# Patient Record
Sex: FEMALE | Race: WHITE
[De-identification: names, ages, dates, MRNs, and addresses within clinical notes are randomized per-mention and may not be internally consistent; named-entity substitution may affect disease eponyms.]

---

## 2017-05-22 ENCOUNTER — HOSPITAL ENCOUNTER (OUTPATIENT)
Dept: HOSPITAL 47 - RADUSMAIN | Age: 22
Discharge: HOME | End: 2017-05-22
Payer: COMMERCIAL

## 2017-05-22 DIAGNOSIS — O20.9: Primary | ICD-10-CM

## 2017-05-22 DIAGNOSIS — Z3A.10: ICD-10-CM

## 2017-05-22 PROCEDURE — 76801 OB US < 14 WKS SINGLE FETUS: CPT

## 2017-05-22 NOTE — US
EXAMINATION TYPE: US OB <= 14 wk fetus

 

DATE OF EXAM: 5/22/2017 7:11 PM

 

COMPARISON: NONE

 

CLINICAL HISTORY: M93.9 Abnormal vaginal bleeding. Spotting x1 day per patient. Cramping

 

EXAM PERFORMED:  Transabdominal (TA)

 

EXAM MEASUREMENTS:

 

GESTATIONAL AGE / DATING

 

Physician Established: (10 weeks/1 days)

** EDC:  12/17/2017

Dates by LMP:  Unsure

Dates by First Scan:  No previous at this facility

Dates by Current Scan for:  (10 weeks/3 days)

** EDC: 12/15/2017

 

MATERNAL ANATOMY 

 

Uterus: 11.5 x 7.0 x 8.6 cm

Right Ovary: 3.3 x 1.7 x 1.6 cm

Left Ovary: 3.4 x 2.1 x 2.4 cm

Post CDS / Adnexa: wnl

Presence of free fluid: No

Presence of corpus luteal cyst: No

Presence of subchorionic bleed: No

 

GESTATION / FETAL SURVEY

 

CRL: 3.5 cm (10 weeks/3 days)

Yolk Sac (normal less than 6mm): 3 mm

Heart Rate: 168 bpm

Rhythm:  Normal

IUP:  Viable IUP

 

 

Date of LMP: Unknown 

Beta HcG (if available):  Not available 

 

Viable IUP, measurements congruent with dates 

 

 

 

IMPRESSION:

Ultrasound gestational age is 10 weeks 3 days. I see no complicating process.

## 2017-07-05 ENCOUNTER — HOSPITAL ENCOUNTER (OUTPATIENT)
Dept: HOSPITAL 47 - LABWHC1 | Age: 22
Discharge: HOME | End: 2017-07-05
Payer: COMMERCIAL

## 2017-07-05 DIAGNOSIS — Z34.02: Primary | ICD-10-CM

## 2017-07-05 DIAGNOSIS — Z3A.00: ICD-10-CM

## 2017-07-05 LAB
CH: 31.1
CHCM: 34.4
ERYTHROCYTE [DISTWIDTH] IN BLOOD BY AUTOMATED COUNT: 3.94 M/UL (ref 3.8–5.4)
ERYTHROCYTE [DISTWIDTH] IN BLOOD: 13.9 % (ref 11.5–15.5)
GLUCOSE SERPL-MCNC: 73 MG/DL (ref 74–99)
HCT VFR BLD AUTO: 35.8 % (ref 34–46)
HDW: 2.22
HEPATITIS B SURFACE AG INDEX: 0.04
HGB BLD-MCNC: 12.7 GM/DL (ref 11.4–16)
MCH RBC QN AUTO: 32.3 PG (ref 25–35)
MCHC RBC AUTO-ENTMCNC: 35.6 G/DL (ref 31–37)
MCV RBC AUTO: 90.8 FL (ref 80–100)
NON-AFRICAN AMERICAN GFR(MDRD): >60
WBC # BLD AUTO: 9.5 K/UL (ref 3.8–10.6)

## 2017-07-05 PROCEDURE — 87340 HEPATITIS B SURFACE AG IA: CPT

## 2017-07-05 PROCEDURE — 82947 ASSAY GLUCOSE BLOOD QUANT: CPT

## 2017-07-05 PROCEDURE — 86900 BLOOD TYPING SEROLOGIC ABO: CPT

## 2017-07-05 PROCEDURE — 86778 TOXOPLASMA ANTIBODY IGM: CPT

## 2017-07-05 PROCEDURE — 84702 CHORIONIC GONADOTROPIN TEST: CPT

## 2017-07-05 PROCEDURE — 36415 COLL VENOUS BLD VENIPUNCTURE: CPT

## 2017-07-05 PROCEDURE — 82105 ALPHA-FETOPROTEIN SERUM: CPT

## 2017-07-05 PROCEDURE — 82565 ASSAY OF CREATININE: CPT

## 2017-07-05 PROCEDURE — 86762 RUBELLA ANTIBODY: CPT

## 2017-07-05 PROCEDURE — 86780 TREPONEMA PALLIDUM: CPT

## 2017-07-05 PROCEDURE — 85027 COMPLETE CBC AUTOMATED: CPT

## 2017-07-05 PROCEDURE — 86777 TOXOPLASMA ANTIBODY: CPT

## 2017-07-05 PROCEDURE — 86850 RBC ANTIBODY SCREEN: CPT

## 2017-07-05 PROCEDURE — 86901 BLOOD TYPING SEROLOGIC RH(D): CPT

## 2017-07-05 PROCEDURE — 86336 INHIBIN A: CPT

## 2017-07-05 PROCEDURE — 82677 ASSAY OF ESTRIOL: CPT

## 2017-07-06 LAB
ALPHA FETOPROTEIN (M.O.M): 0.9
FET NEURAL TUBE DEF RISK FROM MAT AGE QN: 23
GA (DAYS): 5 D
HCG MOM SERPL: 1.05
HCG MOM SERPL: 25.9 IU/ML
INHIBIN A MOM SERPL: 1.59
INHIBIN A SERPL-MCNC: 1.09
T GONDII IGG SER-ACNC: <3 IU/ML (ref ?–7.2)

## 2017-08-25 ENCOUNTER — HOSPITAL ENCOUNTER (OUTPATIENT)
Dept: HOSPITAL 47 - LABWHC1 | Age: 22
Discharge: HOME | End: 2017-08-25
Payer: COMMERCIAL

## 2017-08-25 DIAGNOSIS — Z34.02: Primary | ICD-10-CM

## 2017-08-25 LAB
CH: 32.2
CHCM: 33.9
ERYTHROCYTE [DISTWIDTH] IN BLOOD BY AUTOMATED COUNT: 3.87 M/UL (ref 3.8–5.4)
ERYTHROCYTE [DISTWIDTH] IN BLOOD: 13 % (ref 11.5–15.5)
HCT VFR BLD AUTO: 37 % (ref 34–46)
HDW: 2.19
HGB BLD-MCNC: 12.6 GM/DL (ref 11.4–16)
MCH RBC QN AUTO: 32.5 PG (ref 25–35)
MCHC RBC AUTO-ENTMCNC: 34 G/DL (ref 31–37)
MCV RBC AUTO: 95.5 FL (ref 80–100)
WBC # BLD AUTO: 10 K/UL (ref 3.8–10.6)

## 2017-08-25 PROCEDURE — 36415 COLL VENOUS BLD VENIPUNCTURE: CPT

## 2017-08-25 PROCEDURE — 85027 COMPLETE CBC AUTOMATED: CPT

## 2017-08-25 PROCEDURE — 82950 GLUCOSE TEST: CPT

## 2017-09-19 ENCOUNTER — HOSPITAL ENCOUNTER (OUTPATIENT)
Dept: HOSPITAL 47 - LABWHC1 | Age: 22
Discharge: HOME | End: 2017-09-19
Payer: COMMERCIAL

## 2017-09-19 DIAGNOSIS — O24.419: Primary | ICD-10-CM

## 2017-09-19 DIAGNOSIS — Z3A.00: ICD-10-CM

## 2017-09-19 LAB
GLUCOSE 3H P GLC SERPL-MCNC: 79 MG/DL
GTT SERPL-IMP: (no result)

## 2017-09-19 PROCEDURE — 36415 COLL VENOUS BLD VENIPUNCTURE: CPT

## 2017-09-19 PROCEDURE — 82952 GTT-ADDED SAMPLES: CPT

## 2017-09-19 PROCEDURE — 82951 GLUCOSE TOLERANCE TEST (GTT): CPT

## 2020-08-03 ENCOUNTER — HOSPITAL ENCOUNTER (OUTPATIENT)
Dept: HOSPITAL 47 - FBPOP | Age: 25
Discharge: HOME | End: 2020-08-03
Attending: OBSTETRICS & GYNECOLOGY
Payer: COMMERCIAL

## 2020-08-03 VITALS
DIASTOLIC BLOOD PRESSURE: 74 MMHG | RESPIRATION RATE: 16 BRPM | HEART RATE: 74 BPM | SYSTOLIC BLOOD PRESSURE: 118 MMHG | TEMPERATURE: 98.1 F

## 2020-08-03 DIAGNOSIS — Z3A.33: ICD-10-CM

## 2020-08-03 DIAGNOSIS — O21.9: Primary | ICD-10-CM

## 2020-08-03 LAB
KETONES UR QL STRIP.AUTO: (no result)
PH UR: 5.5 [PH] (ref 5–8)
PROT UR QL: (no result)
RBC UR QL: 2 /HPF (ref 0–5)
SP GR UR: 1.02 (ref 1–1.03)
SQUAMOUS UR QL AUTO: 3 /HPF (ref 0–4)
UROBILINOGEN UR QL STRIP: <2 MG/DL (ref ?–2)
WBC #/AREA URNS HPF: 7 /HPF (ref 0–5)

## 2020-08-03 PROCEDURE — 80306 DRUG TEST PRSMV INSTRMNT: CPT

## 2020-08-03 PROCEDURE — 99214 OFFICE O/P EST MOD 30 MIN: CPT

## 2020-08-03 PROCEDURE — 96360 HYDRATION IV INFUSION INIT: CPT

## 2020-08-03 PROCEDURE — 81001 URINALYSIS AUTO W/SCOPE: CPT

## 2020-08-03 PROCEDURE — 59025 FETAL NON-STRESS TEST: CPT

## 2020-08-06 NOTE — P.MSEPDOC
Presenting Problems





- Arrival Data


Date of Arrival on Unit: 20


Time of Arrival on Unit: 09:55


Mode of Transport: Wheelchair





- Complaint


OB-Reason for Admission/Chief Complaint: Other


Comment: pt arrived c/o throwing up blood since this am





Prenatal Medical History





- Pregnancy Information


: 2


Para: 1


Term: 1


: 0


Abortions: Spontaneous or Elective: 0


Number of Living Children: 1





- Gestational Age


Gestational Age by DONAVON (wks/days): 33 Weeks and 5 Days





- Prenatal History


Comment: pt has had limited prenatal visits





Review of Systems





- Review of Systems


Constitutional: No problems


Breast: No problems


ENT: No problems


Cardiovascular: No problems


Respiratory: No problems


Gastrointestinal: No problems


Genitourinary: No problems


Musculoskeletal: No problems


Neurological: No problems


Skin: No problems





Vital Signs





- Temperature


Temperature: 98.1 F


Temperature Source: Oral





- Pulse


  ** Right Brachial


Pulse Rate: 74


Pulse Assessment Method: Automatic Cuff





- Respirations


Respiratory Rate: 16


Oxygen Delivery Method: Room Air





- Blood Pressure


  ** Right Arm


Blood Pressure: 118/74


Blood Pressure Mean: 88


Blood Pressure Source: Automatic Cuff





Medical Screen Scoring (Pre)





- Cervical Exam


Dilation: 0 cm = 0


Membranes: Intact





- Uterine Contractions


Frequency: N/A


Duration: N/A


Intensity: N/A





- Maternal Vital Signs


Maternal Temperature: N/A


Maternal Blood Pressure: N/A


Signs of Preeclampsia: Nausea/Vomiting = 1


Maternal Respirations: N/A





- Maternal Trauma


Maternal Trauma: N/A





- Fetal Assessment - Baby A


Baseline FHR: 130


Fetal Heart Rate - NICHD Category: Category I (Normal) = 0


NST: Reactive


Fetal Position: N/A


Fetal Station: N/A





- Total Score - Baby A


Total Score - Baby A: 1





- Total Score - Baby B


Total Score - Baby B: 1





- Total Score - Baby C


Total Score - Baby C: 1





- Level of Risk - Baby A


Level of Risk - Baby A: Low (0-5)





- Level of Risk - Baby B


Level of Risk - Baby B: Low (0-5)





- Level of Risk - Baby C


Level of Risk - Baby C: Low (0-5)





Physician Notification (Pre)





- Physician Notified


Physician Notified Date: 20


Physician Notified Time: 10:50


New Order Received: Yes





- Notification Comment


Comment: pt had 1 bile emesis  with no blood noted.  clean catch u/s with  a 

drug urine sent to lab.  iv hydrated with 1000 cc of fluid.  may discharge to 

home after iv hydration





Disposition





- Disposition


OB Disposition: Discharge to home


Discharge Date: 20


Discharge Time: 14:16


I agree with the RN Medical Screening Exam: Yes


Risk & Benefit of care provided described in d/c instruction: Yes


Diagnosis: VOMITING OF PREGNANCY, UNSPECIFIED

## 2020-09-17 NOTE — P.HPOB
History of Present Illness


H&P Date: 20


Chief Complaint: Scheduled repeat  section with tubal





This is a 25 y.o. female,  2, para 1, with an estimated date of 

confinement of 2020, estimated gestational age of 39-4/7 weeks, who 

presents for scheduled repeat  section with bilateral partial 

salpingectomy for family planning.  She admits to good fetal movement and denies

regular contractions.  She did have a gap in prenatal care from 21 to 34 weeks. 

Her drug screens have been positive for marijuana despite being told that she 

cannot use it in pregnancy.





Prenatal labs:





Hepatitis B surface antigen-neg


RPR-NR


Rubella-immune


Blood type-O+


Antibody screen-neg


Hemoglobin-12


Quad-neg


Random glucose-67


GBS-neg


1 hr. GTT-111





OB Hx:  .  Elective  due to HSV


Gyn Hx:  Hx HSV.


Social Hx:  Single.  Unemployed.





Review of Systems


Constitutional: Denies chills, Denies fever


Eyes: denies blurred vision, denies pain


Ears, nose, mouth and throat: Denies headache, Denies sore throat


Cardiovascular: Denies chest pain, Denies shortness of breath


Respiratory: Denies cough


Gastrointestinal: Reports abdominal pain (irreg. ctxs.)


Genitourinary: Reports pelvic pain, Reports pregnant


Musculoskeletal: Reports low back pain


Integumentary: Denies pruritus, Denies rash


Neurological: Denies numbness, Denies weakness


Psychiatric: Reports depression





Past Medical History


Past Medical History: No Reported History


History of Any Multi-Drug Resistant Organisms: None Reported


Past Surgical History:  Section


Past Anesthesia/Blood Transfusion Reactions: No Reported Reaction


Past Psychological History: Depression


Smoking Status: Former smoker


Past Alcohol Use History: None Reported


Past Drug Use History: Marijuana





- Past Family History


  ** Mother


Family Medical History: No Reported History





Medications and Allergies


                                Home Medications











 Medication  Instructions  Recorded  Confirmed  Type


 


Pnv No.95/Ferrous Fum/Folic AC 1 each PO DAILY 17 History





[Prenatal Multivitamin Tablet]    








                                    Allergies











Allergy/AdvReac Type Severity Reaction Status Date / Time


 


No Known Allergies Allergy   Verified 20 06:50














Exam


Osteopathic Statement: *.  No significant issues noted on an osteopathic 

structural exam other than those noted in the History and Physical/Consult.





HEENT:  within normal limits


Heart:  regular rate and rhythm


Lungs:  clear to auscultation bilaterally


Abdomen:  , non-tender


Cervix:  cl./60%/-1


Fetal heart tones:  110-115s by doppler


Extremities:  neg. Shruti's





Results


Result Diagrams: 


                                 20 07:29








Assessment and Plan


(1) Family planning


Current Visit: No   Status: Acute   Code(s): Z30.09 - ENCOUNTER FOR [facility] GENERAL 

CNSL AND ADVICE ON CONTRACEPTION   SNOMED Code(s): 956319885


   





(2) 39 weeks gestation of pregnancy


Current Visit: No   Status: Acute   Code(s): Z3A.39 - 39 WEEKS GESTATION OF 

PREGNANCY   SNOMED Code(s): 54366253


   


Plan: 





Proceed with repeat  section with bilateral partial salpingectomy.





I have discussed the risks, benefits, and alternative therapies for the above-

mentioned procedure and for both sedation/anesthesia as well as necessary blood 

products administration, if indicated, as they pertain to this patient.  The 

patient has indicated her understanding and acceptance of the risks and 

procedures discussed.

## 2020-09-18 ENCOUNTER — HOSPITAL ENCOUNTER (INPATIENT)
Dept: HOSPITAL 47 - 4FBP | Age: 25
LOS: 2 days | Discharge: HOME | End: 2020-09-20
Attending: OBSTETRICS & GYNECOLOGY | Admitting: OBSTETRICS & GYNECOLOGY
Payer: COMMERCIAL

## 2020-09-18 VITALS — BODY MASS INDEX: 27.1 KG/M2

## 2020-09-18 DIAGNOSIS — Z3A.39: ICD-10-CM

## 2020-09-18 DIAGNOSIS — Z79.899: ICD-10-CM

## 2020-09-18 DIAGNOSIS — O34.211: Primary | ICD-10-CM

## 2020-09-18 DIAGNOSIS — F32.9: ICD-10-CM

## 2020-09-18 DIAGNOSIS — Z87.891: ICD-10-CM

## 2020-09-18 DIAGNOSIS — Z30.2: ICD-10-CM

## 2020-09-18 LAB
BASOPHILS # BLD AUTO: 0 K/UL (ref 0–0.2)
BASOPHILS NFR BLD AUTO: 0 %
EOSINOPHIL # BLD AUTO: 0.2 K/UL (ref 0–0.7)
EOSINOPHIL NFR BLD AUTO: 3 %
ERYTHROCYTE [DISTWIDTH] IN BLOOD BY AUTOMATED COUNT: 3.86 M/UL (ref 3.8–5.4)
ERYTHROCYTE [DISTWIDTH] IN BLOOD: 12.7 % (ref 11.5–15.5)
HCT VFR BLD AUTO: 34.8 % (ref 34–46)
HGB BLD-MCNC: 11.3 GM/DL (ref 11.4–16)
LYMPHOCYTES # SPEC AUTO: 1.5 K/UL (ref 1–4.8)
LYMPHOCYTES NFR SPEC AUTO: 22 %
MCH RBC QN AUTO: 29.2 PG (ref 25–35)
MCHC RBC AUTO-ENTMCNC: 32.4 G/DL (ref 31–37)
MCV RBC AUTO: 90.1 FL (ref 80–100)
MONOCYTES # BLD AUTO: 0.3 K/UL (ref 0–1)
MONOCYTES NFR BLD AUTO: 5 %
NEUTROPHILS # BLD AUTO: 4.6 K/UL (ref 1.3–7.7)
NEUTROPHILS NFR BLD AUTO: 69 %
PLATELET # BLD AUTO: 238 K/UL (ref 150–450)
WBC # BLD AUTO: 6.7 K/UL (ref 3.8–10.6)

## 2020-09-18 PROCEDURE — 86901 BLOOD TYPING SEROLOGIC RH(D): CPT

## 2020-09-18 PROCEDURE — 80306 DRUG TEST PRSMV INSTRMNT: CPT

## 2020-09-18 PROCEDURE — 86900 BLOOD TYPING SEROLOGIC ABO: CPT

## 2020-09-18 PROCEDURE — 0UB70ZZ EXCISION OF BILATERAL FALLOPIAN TUBES, OPEN APPROACH: ICD-10-PCS

## 2020-09-18 PROCEDURE — 88302 TISSUE EXAM BY PATHOLOGIST: CPT

## 2020-09-18 PROCEDURE — 86850 RBC ANTIBODY SCREEN: CPT

## 2020-09-18 PROCEDURE — 85025 COMPLETE CBC W/AUTO DIFF WBC: CPT

## 2020-09-18 RX ADMIN — POTASSIUM CHLORIDE SCH: 14.9 INJECTION, SOLUTION INTRAVENOUS at 09:00

## 2020-09-18 RX ADMIN — PRENATAL W/O A VIT W/ FE FUMARATE-FA CAP 106.5-1 MG SCH: 106.5 CAPSULE ORAL at 09:00

## 2020-09-18 RX ADMIN — DOCUSATE SODIUM AND SENNOSIDES SCH: 50; 8.6 TABLET ORAL at 21:03

## 2020-09-18 RX ADMIN — KETOROLAC TROMETHAMINE PRN MG: 15 INJECTION, SOLUTION INTRAMUSCULAR; INTRAVENOUS at 16:44

## 2020-09-18 RX ADMIN — POTASSIUM CHLORIDE SCH MLS/HR: 14.9 INJECTION, SOLUTION INTRAVENOUS at 12:11

## 2020-09-18 NOTE — P.OP
Date of Procedure: 20


Preoperative Diagnosis: 


1.  Intrauterine pregnancy at 39-4/7 weeks.


2.  History of previous  section.


3.  Family-planning.








Postoperative Diagnosis: 


Same


Procedure(s) Performed: 


Repeat low transverse  section with bilateral partial salpingectomy


Anesthesia: spinal (Duramorph)


Surgeon: Estefany Dudley


Assistant #1: Emmy Alexander


Estimated Blood Loss (ml): 350


Pathology: other (Portions of right and left fallopian tubes)


Condition: stable


Disposition: floor


Indications for Procedure: 


This is a 25 year old female  2 para 1 at 39-4/7 weeks who presents for 

scheduled repeat  section with bilateral partial salpingectomy.





I have discussed the risks, benefits, and alternative therapies for the above-

mentioned procedure and for both sedation/anesthesia as well as necessary blood 

products administration, if indicated, as they pertain to this patient.  The 

patient has indicated her understanding and acceptance of the risks and 

procedures discussed.





Operative Findings: 


A viable male infant is noted in the vertex presentation with Apgar scores of 9 

at 1 minute and 9 at 5 minutes, infant weight of 6 lbs. 12 oz.  Nuchal cord 

times one was noted.  Normal uterus tubes and ovaries are noted.


Description of Procedure: 


The patient is taken to the operating room where she is placed in the dorsal 

supine position with leftward tilt after spinal Duramorph anesthesia is given.  

She is prepped and draped in the normal sterile fashion.  Skin was tested and 

found to be adequately anesthetized.  A Pfannenstiel skin incision was made with

a scalpel.  A second knife was used to carry the incision down to the underlying

layer of fascia.  The fascia was nicked in the midline with a scalpel and then 

extended laterally bilaterally with Cazares scissors.  The anterior lip of the 

fascia was grasped with 2 Kocher clamps and then dissected off the underlying 

rectus muscle in the midline with Cazares scissors.  The inferior aspect of the 

fascial incision was grasped with 2 Kocher clamps and dissected off the 

underlying rectus muscle and the midline with Cazares scissors.  Next the 

peritoneum layer was tented up with 2 hemostats and then entered sharply with 

the scalpel.  The incision is extended superiorly and inferiorly with Metzenbaum

scissors.  Next a DeLee retractor is placed.  The vesicouterine peritoneum is 

entered sharply with Metzenbaum scissors and extended laterally bilaterally with

Metzenbaum scissors and then the bladder flap is pushed inferiorly.  The lower 

uterine segment is incised in transverse fashion with the scalpel and then 

bluntly entered with a hemostat.  Clear fluid is noted.  The incision was then 

extended laterally bilaterally with 2 fingers.  Next the infant's head is 

delivered through the incision.  Nose and mouth are bulb suctioned.  The rem

ainder of the infant is easily delivered and placed on mother's abdomen.  Nuchal

cord 1 is reduced around the infant's head with delivery.  Cord is clamped and 

cut.  Infant is taken to warmer by nursing staff.  Uterine fundus is gently 

massaged and placenta is delivered manually.  Uterus is exteriorized and cleared

of all clots and debris.  Uterine incision is closed with 0 Vicryl suture in a 

running locked fashion.  A second layer of 0 Vicryl suture is used in a running 

fashion for hemostasis.  Once adequate hemostasis as assured, the vesicouterine 

peritoneum is reapproximated with 2-0 Vicryl suture in a running fashion.  The 

right fallopian tube is grasped in the midportion with a hemostat.  Mesosalpinx 

is entered with Bovie cautery.  0 Vicryl suture is tied 2 times around both the 

distal and proximal portions of the tube.  The knuckle of tube is removed with 

Metzenbaum scissors.  The ends of the tubes are cauterized with Bovie cautery.  

The same procedure is carried out on the left fallopian tube.  Posterior 

cul-de-sac is suctioned of all clots and debris.  Uterus is returned to the 

abdomen.  Incision is noted to be hemostatic.  Both tubal sites are noted to be 

hemostatic.  Peritoneal layer is closed with 0 Vicryl suture in a running 

fashion.  Muscle layer is reapproximated with 0 Vicryl suture in interrupted 

fashion.  Fascia layer is then closed with 0 PDS suture with 2 sutures meeting 

in the midline and the knots buried in either side and in the midline.  The 

subcutaneous tissue was then closed with 2-0 Vicryl suture.  Skin layer was then

closed with staples.  All sponge and needle counts are correct.  The patient is 

taken to recovery room in stable condition.

## 2020-09-19 LAB
BASOPHILS # BLD AUTO: 0 K/UL (ref 0–0.2)
BASOPHILS NFR BLD AUTO: 0 %
EOSINOPHIL # BLD AUTO: 0.2 K/UL (ref 0–0.7)
EOSINOPHIL NFR BLD AUTO: 3 %
ERYTHROCYTE [DISTWIDTH] IN BLOOD BY AUTOMATED COUNT: 3.59 M/UL (ref 3.8–5.4)
ERYTHROCYTE [DISTWIDTH] IN BLOOD: 12.5 % (ref 11.5–15.5)
HCT VFR BLD AUTO: 31.8 % (ref 34–46)
HGB BLD-MCNC: 10.1 GM/DL (ref 11.4–16)
LYMPHOCYTES # SPEC AUTO: 1.3 K/UL (ref 1–4.8)
LYMPHOCYTES NFR SPEC AUTO: 18 %
MCH RBC QN AUTO: 28.1 PG (ref 25–35)
MCHC RBC AUTO-ENTMCNC: 31.7 G/DL (ref 31–37)
MCV RBC AUTO: 88.4 FL (ref 80–100)
MONOCYTES # BLD AUTO: 0.3 K/UL (ref 0–1)
MONOCYTES NFR BLD AUTO: 5 %
NEUTROPHILS # BLD AUTO: 5.2 K/UL (ref 1.3–7.7)
NEUTROPHILS NFR BLD AUTO: 73 %
PLATELET # BLD AUTO: 202 K/UL (ref 150–450)
WBC # BLD AUTO: 7 K/UL (ref 3.8–10.6)

## 2020-09-19 RX ADMIN — POTASSIUM CHLORIDE SCH: 14.9 INJECTION, SOLUTION INTRAVENOUS at 02:21

## 2020-09-19 RX ADMIN — KETOROLAC TROMETHAMINE PRN MG: 15 INJECTION, SOLUTION INTRAMUSCULAR; INTRAVENOUS at 00:49

## 2020-09-19 RX ADMIN — HYDROCODONE BITARTRATE AND ACETAMINOPHEN PRN EACH: 5; 325 TABLET ORAL at 17:25

## 2020-09-19 RX ADMIN — IBUPROFEN PRN MG: 600 TABLET ORAL at 08:08

## 2020-09-19 RX ADMIN — HYDROCODONE BITARTRATE AND ACETAMINOPHEN PRN EACH: 5; 325 TABLET ORAL at 21:16

## 2020-09-19 RX ADMIN — PRENATAL W/O A VIT W/ FE FUMARATE-FA CAP 106.5-1 MG SCH EACH: 106.5 CAPSULE ORAL at 09:11

## 2020-09-19 RX ADMIN — DOCUSATE SODIUM AND SENNOSIDES SCH EACH: 50; 8.6 TABLET ORAL at 08:07

## 2020-09-19 NOTE — P.PNOBGPC
Subjective





- Subjective


Principal diagnosis: Status post repeat low transverse  with tubal 

ligation postop day2


Interval history: 





Patient seen and examined.  Denies nausea, vomiting, chest pain, shortness of 

breath or calf pain.  She is ambulating and voiding without difficulty.  She is 

tolerating regular diet and passing flatus.


Patient reports: Reports appetite normal, Reports voiding normally, Reports pain

well controlled, Reports ambulating normally


: doing well





Objective





- Vital Signs


Latest vital signs: 


                                   Vital Signs











  Temp Pulse Pulse Resp BP Pulse Ox


 


 20 08:00  98.1 F  80   16  113/58 


 


 20 04:00  97.8 F  68   16  104/60  97


 


 20 00:00  98.8 F  84   16  112/57  97


 


 20 20:00  98.1 F  79   16  113/70  98


 


 20 16:30  98.1 F  66   16  118/62 


 


 20 12:00  98.8 F  70   16  118/70  99


 


 20 10:46   66   16  117/74  99


 


 20 10:16    65  16  113/74 


 


 20 09:46    61  16  112/71  100








                                Intake and Output











 20





 22:59 06:59 14:59


 


Output Total 2400 2000 


 


Balance -2400 -2000 


 


Output:   


 


  Urine 2400 2000 


 


    Uretheral ([place]) 1200 700 


 


Other:   


 


  # Voids  1 














- Exam


Lungs: bilateral: normal


Chest: Normal S1, Normal S2


Extremities: Present: normal


Abdomen: Present: normal appearance, soft.  Absent: distention, tenderness


Incision: Present: normal, dry, intact


Uterus: Present: normal, firm





- Labs


Labs: 


                  Abnormal Lab Results - Last 24 Hours (Table)











  20 Range/Units





  06:49 


 


RBC  3.59 L  (3.80-5.40)  m/uL


 


Hgb  10.1 L  (11.4-16.0)  gm/dL


 


Hct  31.8 L  (34.0-46.0)  %














Assessment and Plan


(1) Status post repeat low transverse  section


Current Visit: Yes   Status: Acute   Code(s): Z98.891 - HISTORY OF UTERINE SCAR 

FROM PREVIOUS SURGERY   SNOMED Code(s): 105334334


   





(2) Status post tubal ligation at time of delivery, current hosp


Current Visit: Yes   Status: Acute   Code(s): O80 - ENCOUNTER FOR FULL-TERM 

UNCOMPLICATED DELIVERY; Z30.2 - ENCOUNTER FOR STERILIZATION   SNOMED Code(s): 

544801365


   


Plan: 





1.  Increase ambulation


2.  By mouth pain medication


3.  Continue postoperative care

## 2020-09-20 VITALS
HEART RATE: 81 BPM | TEMPERATURE: 97.9 F | RESPIRATION RATE: 18 BRPM | SYSTOLIC BLOOD PRESSURE: 126 MMHG | DIASTOLIC BLOOD PRESSURE: 72 MMHG

## 2020-09-20 RX ADMIN — IBUPROFEN PRN MG: 600 TABLET ORAL at 07:57

## 2020-09-20 RX ADMIN — PRENATAL W/O A VIT W/ FE FUMARATE-FA CAP 106.5-1 MG SCH EACH: 106.5 CAPSULE ORAL at 07:56

## 2020-09-20 RX ADMIN — DOCUSATE SODIUM AND SENNOSIDES SCH: 50; 8.6 TABLET ORAL at 01:14

## 2020-09-20 RX ADMIN — POTASSIUM CHLORIDE SCH: 14.9 INJECTION, SOLUTION INTRAVENOUS at 01:15

## 2020-09-20 RX ADMIN — POTASSIUM CHLORIDE SCH: 14.9 INJECTION, SOLUTION INTRAVENOUS at 01:14

## 2020-09-20 RX ADMIN — DOCUSATE SODIUM AND SENNOSIDES SCH EACH: 50; 8.6 TABLET ORAL at 07:56

## 2020-09-20 NOTE — P.DS
Providers


Date of admission: 


20 06:45





Expected date of discharge: 20


Attending physician: 


Estefany Dudley





Primary care physician: 


Stated None








- Discharge Diagnosis(es)


(1) Status post repeat low transverse  section


Current Visit: Yes   Status: Acute   





(2) Status post tubal ligation at time of delivery, current hosp


Current Visit: Yes   Status: Acute   


Hospital Course: 





Patient presented for repeat low transverse  and tubal ligation.  She 

underwent these procedures without complication.  Postpartum course was 

uncomplicated as well.  She denies nausea, vomiting, chest pain, shortness of 

breath or calf pain.  Pain is well-controlled with Norco and Motrin.  She is am

bulating voiding without difficulty, passing flatus and tolerate regular diet.  

She'll be discharged home postpartum day #2 in stable condition to follow-up 

with Dr. Dudley in one week.





Plan - Discharge Summary


Discharge Rx Participant: Yes


New Discharge Prescriptions: 


New


   Ibuprofen [Motrin] 600 mg PO Q6HR PRN #30 tab


     PRN Reason: Mild Pain Or Fever >= 100.5


   HYDROcodone/APAP 7.5-325MG [Norco 7.5-325] 1 each PO Q6H PRN #12 tab


     PRN Reason: Severe Pain





No Action


   Pnv No.95/Ferrous Fum/Folic AC [Prenatal Multivitamin Tablet] 1 each PO DAILY


Discharge Medication List





Pnv No.95/Ferrous Fum/Folic AC [Prenatal Multivitamin Tablet] 1 each PO DAILY 

17 [History]


HYDROcodone/APAP 7.5-325MG [Norco 7.5-325] 1 each PO Q6H PRN #12 tab 20 

[Rx]


Ibuprofen [Motrin] 600 mg PO Q6HR PRN #30 tab 20 [Rx]








Follow up Appointment(s)/Referral(s): 


Estefany Dudley DO [Doctor of Osteopathic Medicine] - 1 Week


Discharge Disposition: HOME SELF-CARE